# Patient Record
Sex: MALE | Race: WHITE
[De-identification: names, ages, dates, MRNs, and addresses within clinical notes are randomized per-mention and may not be internally consistent; named-entity substitution may affect disease eponyms.]

---

## 2018-11-07 ENCOUNTER — HOSPITAL ENCOUNTER (OUTPATIENT)
Age: 83
End: 2018-11-07
Payer: OTHER GOVERNMENT

## 2018-11-07 DIAGNOSIS — R07.9: ICD-10-CM

## 2018-11-07 DIAGNOSIS — I10: ICD-10-CM

## 2018-11-07 DIAGNOSIS — E78.5: Primary | ICD-10-CM

## 2018-11-07 LAB
ADD MANUAL DIFF / SLIDE REVIEW: NO
ALBUMIN SERPL-MCNC: 4.3 G/DL (ref 3.5–5)
ALBUMIN/GLOB SERPL: 1.4 {RATIO} (ref 1–2.8)
ALP SERPL-CCNC: 114 U/L (ref 38–126)
ALT SERPL-CCNC: 30 IU/L (ref 21–72)
APPEARANCE UR: CLEAR
BILIRUBIN URINE UA: NEGATIVE
BUN SERPL-MCNC: 22 MG/DL (ref 9–20)
CALCIUM SERPL-MCNC: 9.2 MG/DL (ref 8.4–10.2)
CHLORIDE SERPL-SCNC: 100 MMOL/L (ref 98–107)
CHOLEST SERPL-MCNC: 183 MG/DL (ref 140–199)
CO2 SERPL-SCNC: 28 MMOL/L (ref 22–32)
COLOR UR: YELLOW
ESTIMATED GLOMERULAR FILT RATE: 41.6 ML/MIN (ref 60–?)
GLOBULIN SER CALC-MCNC: 3.1 G/DL (ref 1.7–4.1)
GLUCOSE SERPL-MCNC: 95 MG/DL (ref 80–110)
GLUCOSE URINE UA: NEGATIVE G/DL
HDLC SERPL-MCNC: 29 MG/DL (ref 40–60)
HEMATOCRIT: 37.1 % (ref 41–53)
HEMOGLOBIN: 12.5 G/DL (ref 13.5–17.5)
HEMOLYSIS: < 15 (ref 0–50)
HGB UR QL: NEGATIVE
KETONES URINE UA: NEGATIVE
LEUKOCYTE ESTERASE URINE UA: NEGATIVE
MCV RBC: 88.2 FL (ref 80–100)
MEAN CORPUSCULAR HEMOGLOBIN: 29.9 PG (ref 26–34)
MEAN CORPUSCULAR HGB CONC: 33.8 % (ref 30–36)
NITRITE URINE UA: NEGATIVE
PH UR: 5.5 [PH] (ref 4.5–8)
PLATELET COUNT: 261 X10^3/UL (ref 150–400)
POTASSIUM SERPL-SCNC: 4 MMOL/L (ref 3.4–5.1)
PROT SERPL-MCNC: 7.4 G/DL (ref 6.3–8.2)
PROTEIN URINE UA: NEGATIVE
SODIUM SERPL-SCNC: 144 MMOL/L (ref 137–145)
SP GR UR: 1.02 (ref 1–1.03)
TRIGL SERPL-MCNC: 156 MG/DL (ref 35–150)
TSH SERPL DL<=0.05 MIU/L-ACNC: 2.73 UIU/ML (ref 0.47–4.68)
UROBILINOGEN UR QL: 0.2 E.U./DL

## 2018-11-07 PROCEDURE — 36415 COLL VENOUS BLD VENIPUNCTURE: CPT

## 2018-11-07 PROCEDURE — 84443 ASSAY THYROID STIM HORMONE: CPT

## 2018-11-07 PROCEDURE — 85025 COMPLETE CBC W/AUTO DIFF WBC: CPT

## 2018-11-07 PROCEDURE — 81003 URINALYSIS AUTO W/O SCOPE: CPT

## 2018-11-07 PROCEDURE — 80061 LIPID PANEL: CPT

## 2018-11-07 PROCEDURE — 80053 COMPREHEN METABOLIC PANEL: CPT

## 2019-07-12 ENCOUNTER — HOSPITAL ENCOUNTER (OUTPATIENT)
Age: 84
End: 2019-07-12
Payer: OTHER GOVERNMENT

## 2019-07-12 DIAGNOSIS — I10: ICD-10-CM

## 2019-07-12 DIAGNOSIS — E78.5: Primary | ICD-10-CM

## 2019-07-12 LAB
ADD MANUAL DIFF / SLIDE REVIEW: NO
ALBUMIN SERPL-MCNC: 4.2 G/DL (ref 3.5–5)
ALBUMIN/GLOB SERPL: 1.3 {RATIO} (ref 1–2.8)
ALP SERPL-CCNC: 133 U/L (ref 38–126)
ALT SERPL-CCNC: 23 IU/L (ref 21–72)
BUN SERPL-MCNC: 21 MG/DL (ref 9–20)
CALCIUM SERPL-MCNC: 9 MG/DL (ref 8.4–10.2)
CHLORIDE SERPL-SCNC: 100 MMOL/L (ref 98–107)
CHOLEST SERPL-MCNC: 110 MG/DL (ref 140–199)
CO2 SERPL-SCNC: 29 MMOL/L (ref 22–32)
ESTIMATED GLOMERULAR FILT RATE: 41.6 ML/MIN (ref 60–?)
GLOBULIN SER CALC-MCNC: 3.2 G/DL (ref 1.7–4.1)
GLUCOSE SERPL-MCNC: 97 MG/DL (ref 80–110)
HDLC SERPL-MCNC: 23 MG/DL (ref 40–60)
HEMATOCRIT: 37.6 % (ref 41–53)
HEMOGLOBIN: 12.7 G/DL (ref 13.5–17.5)
HEMOLYSIS: < 15 (ref 0–50)
LYMPHOCYTES # SPEC AUTO: 1500 /UL (ref 1100–4500)
MCV RBC: 87.9 FL (ref 80–100)
MEAN CORPUSCULAR HEMOGLOBIN: 29.6 PG (ref 26–34)
MEAN CORPUSCULAR HGB CONC: 33.7 % (ref 30–36)
PLATELET COUNT: 239 X10^3/UL (ref 150–400)
POTASSIUM SERPL-SCNC: 3.3 MMOL/L (ref 3.4–5.1)
PROT SERPL-MCNC: 7.4 G/DL (ref 6.3–8.2)
SODIUM SERPL-SCNC: 142 MMOL/L (ref 137–145)
TRIGL SERPL-MCNC: 175 MG/DL (ref 35–150)

## 2019-07-12 PROCEDURE — 80061 LIPID PANEL: CPT

## 2019-07-12 PROCEDURE — 85025 COMPLETE CBC W/AUTO DIFF WBC: CPT

## 2019-07-12 PROCEDURE — 80053 COMPREHEN METABOLIC PANEL: CPT

## 2019-07-12 PROCEDURE — 36415 COLL VENOUS BLD VENIPUNCTURE: CPT

## 2019-07-29 ENCOUNTER — HOSPITAL ENCOUNTER (OUTPATIENT)
Age: 84
End: 2019-07-29
Payer: OTHER GOVERNMENT

## 2019-07-29 DIAGNOSIS — Z90.49: ICD-10-CM

## 2019-07-29 DIAGNOSIS — R74.0: ICD-10-CM

## 2019-07-29 DIAGNOSIS — N28.1: Primary | ICD-10-CM

## 2019-07-29 PROCEDURE — 76700 US EXAM ABDOM COMPLETE: CPT

## 2019-07-29 NOTE — DI.US.S_ITS
PROCEDURE:  US ABDOMEN COMPLETE  
   
INDICATIONS:  ELEVATED LDH  
   
TECHNIQUE:    
Real-time scanning was performed of the abdominal and retroperitoneal organs, with image   
documentation.    
   
COMPARISON:  None.  
   
FINDINGS:    
   
Liver:  Liver is normal in size and demonstrates diffusely increased echotexture.    
   
Gallbladder: The gallbladder is surgically removed  
   
Biliary ducts:  Intrahepatic bile ducts are non-dilated.  Extrahepatic bile duct caliber   
measures 9.4 mm.  Normal is 6-7 mm or less in diameter, or 10 mm or less   
post-cholecystectomy.    
   
Pancreas:  Visualized portions of the pancreas are sonographically normal.    
   
Spleen:  Spleen is normal in size and homogeneous in echotexture.    
   
Kidneys:  Kidneys are normal in size and echotexture.  Right kidney measures 10.5 cm   
long; left kidney measures 11.0 cm long.  No hydronephrosis or nephrolithiasis.  No solid   
masses.  There is a 3.0 x 1.9 x 2.6 cm simple cyst in the mid left kidney.  
   
Aorta:  Visualized aorta is normal in caliber at less than 3 cm.    
   
Iliacs:  Proximal common iliac arteries are normal in caliber at less than 2.5 cm.    
   
IVC:  Intrahepatic inferior vena cava is patent.    
   
Miscellaneous:  No free abdominal fluid.    
   
   
IMPRESSION:  
   
1.  Diffusely increased hepatic echotexture. This finding is most likely secondary to   
hepatic fatty infiltration although other hepatocellular disease may have a similar   
appearance. Recommend clinical correlation.  
   
2. Cholecystectomy.  
   
3. A 3.0 x 1.9 x 2.6 cm simple cyst in the left kidney.  
   
   
   
Dictated by: Pascual Meier M.D. on 7/29/2019 at 10:50       
Approved by: Pascual Meier M.D. on 7/29/2019 at 10:52

## 2019-10-21 ENCOUNTER — HOSPITAL ENCOUNTER (OUTPATIENT)
Age: 84
End: 2019-10-21
Payer: OTHER GOVERNMENT

## 2019-10-21 DIAGNOSIS — N18.9: ICD-10-CM

## 2019-10-21 DIAGNOSIS — I10: Primary | ICD-10-CM

## 2019-10-21 DIAGNOSIS — R74.0: ICD-10-CM

## 2019-10-21 DIAGNOSIS — K76.0: ICD-10-CM

## 2019-10-21 DIAGNOSIS — Z95.1: ICD-10-CM

## 2019-10-21 LAB
ADD MANUAL DIFF / SLIDE REVIEW: NO
ALBUMIN SERPL-MCNC: 4 G/DL (ref 3.5–5)
ALBUMIN/GLOB SERPL: 1.3 {RATIO} (ref 1–2.8)
ALP SERPL-CCNC: 134 U/L (ref 38–126)
ALT SERPL-CCNC: 19 IU/L (ref 21–72)
BUN SERPL-MCNC: 37 MG/DL (ref 9–20)
CALCIUM SERPL-MCNC: 9.1 MG/DL (ref 8.4–10.2)
CHLORIDE SERPL-SCNC: 103 MMOL/L (ref 98–107)
CO2 SERPL-SCNC: 27 MMOL/L (ref 22–32)
ESTIMATED GLOMERULAR FILT RATE: 36.2 ML/MIN (ref 60–?)
GLOBULIN SER CALC-MCNC: 3 G/DL (ref 1.7–4.1)
GLUCOSE SERPL-MCNC: 100 MG/DL (ref 80–110)
HEMATOCRIT: 37.1 % (ref 41–53)
HEMOGLOBIN: 12.3 G/DL (ref 13.5–17.5)
HEMOLYSIS: < 15 (ref 0–50)
LYMPHOCYTES # SPEC AUTO: 2200 /UL (ref 1100–4500)
MCV RBC: 91 FL (ref 80–100)
MEAN CORPUSCULAR HEMOGLOBIN: 30.1 PG (ref 26–34)
MEAN CORPUSCULAR HGB CONC: 33.1 % (ref 30–36)
PLATELET COUNT: 195 X10^3/UL (ref 150–400)
POTASSIUM SERPL-SCNC: 4.5 MMOL/L (ref 3.4–5.1)
PROT SERPL-MCNC: 7 G/DL (ref 6.3–8.2)
SODIUM SERPL-SCNC: 140 MMOL/L (ref 137–145)

## 2019-10-21 PROCEDURE — 80053 COMPREHEN METABOLIC PANEL: CPT

## 2019-10-21 PROCEDURE — 36415 COLL VENOUS BLD VENIPUNCTURE: CPT

## 2019-10-21 PROCEDURE — 85025 COMPLETE CBC W/AUTO DIFF WBC: CPT

## 2019-12-27 ENCOUNTER — HOSPITAL ENCOUNTER (OUTPATIENT)
Age: 84
End: 2019-12-27
Payer: OTHER GOVERNMENT

## 2019-12-27 DIAGNOSIS — I10: Primary | ICD-10-CM

## 2019-12-27 LAB — MAGNESIUM SERPL-MCNC: 1.8 MG/DL (ref 1.6–2.3)

## 2019-12-27 PROCEDURE — 83735 ASSAY OF MAGNESIUM: CPT

## 2019-12-27 PROCEDURE — 36415 COLL VENOUS BLD VENIPUNCTURE: CPT

## 2020-05-22 ENCOUNTER — HOSPITAL ENCOUNTER (OUTPATIENT)
Age: 85
End: 2020-05-22
Payer: OTHER GOVERNMENT

## 2020-05-22 DIAGNOSIS — I10: ICD-10-CM

## 2020-05-22 DIAGNOSIS — E78.5: Primary | ICD-10-CM

## 2020-05-22 DIAGNOSIS — F32.9: ICD-10-CM

## 2020-05-22 DIAGNOSIS — N18.3: ICD-10-CM

## 2020-05-22 DIAGNOSIS — Z79.899: ICD-10-CM

## 2020-05-22 LAB
ALBUMIN SERPL-MCNC: 4 G/DL (ref 3.5–5)
ALBUMIN/GLOB SERPL: 1.2 {RATIO} (ref 1–2.8)
ALP SERPL-CCNC: 124 U/L (ref 38–126)
ALT SERPL-CCNC: 17 IU/L (ref ?–50)
BUN SERPL-MCNC: 26 MG/DL (ref 9–20)
CALCIUM SERPL-MCNC: 8.9 MG/DL (ref 8.4–10.2)
CHLORIDE SERPL-SCNC: 108 MMOL/L (ref 98–107)
CHOLEST SERPL-MCNC: 99 MG/DL (ref 140–199)
CO2 SERPL-SCNC: 24 MMOL/L (ref 22–32)
ESTIMATED GLOMERULAR FILT RATE: 40.6 ML/MIN (ref 60–?)
GLOBULIN SER CALC-MCNC: 3.3 G/DL (ref 1.7–4.1)
GLUCOSE SERPL-MCNC: 94 MG/DL (ref 80–110)
HDLC SERPL-MCNC: 23 MG/DL (ref 40–60)
HEMOLYSIS: < 15 (ref 0–50)
MICROALBUMI CREATININ RATIO UR: 8 UG/MG CR (ref ?–30)
POTASSIUM SERPL-SCNC: 4.7 MMOL/L (ref 3.4–5.1)
PROT SERPL-MCNC: 7.3 G/DL (ref 6.3–8.2)
SODIUM SERPL-SCNC: 141 MMOL/L (ref 137–145)
T3FREE SERPL-MCNC: 3.63 PG/ML (ref 2.77–5.27)
T4 FREE SERPL-MCNC: 1.1 NG/DL (ref 0.78–2.19)
TRIGL SERPL-MCNC: 66 MG/DL (ref 35–150)
TSH SERPL DL<=0.05 MIU/L-ACNC: 1.62 UIU/ML (ref 0.47–4.68)

## 2020-05-22 PROCEDURE — 82570 ASSAY OF URINE CREATININE: CPT

## 2020-05-22 PROCEDURE — 36415 COLL VENOUS BLD VENIPUNCTURE: CPT

## 2020-05-22 PROCEDURE — 84443 ASSAY THYROID STIM HORMONE: CPT

## 2020-05-22 PROCEDURE — 82043 UR ALBUMIN QUANTITATIVE: CPT

## 2020-05-22 PROCEDURE — 84481 FREE ASSAY (FT-3): CPT

## 2020-05-22 PROCEDURE — 80061 LIPID PANEL: CPT

## 2020-05-22 PROCEDURE — 80053 COMPREHEN METABOLIC PANEL: CPT

## 2020-05-22 PROCEDURE — 84439 ASSAY OF FREE THYROXINE: CPT

## 2020-07-03 ENCOUNTER — HOSPITAL ENCOUNTER (OUTPATIENT)
Age: 85
End: 2020-07-03
Payer: OTHER GOVERNMENT

## 2020-07-03 DIAGNOSIS — M47.816: ICD-10-CM

## 2020-07-03 DIAGNOSIS — M54.9: Primary | ICD-10-CM

## 2020-07-03 PROCEDURE — 72100 X-RAY EXAM L-S SPINE 2/3 VWS: CPT

## 2020-08-31 ENCOUNTER — HOSPITAL ENCOUNTER (OUTPATIENT)
Age: 85
End: 2020-08-31
Payer: OTHER GOVERNMENT

## 2020-08-31 DIAGNOSIS — M47.817: ICD-10-CM

## 2020-08-31 DIAGNOSIS — M47.816: ICD-10-CM

## 2020-08-31 DIAGNOSIS — M54.9: Primary | ICD-10-CM

## 2020-08-31 PROCEDURE — 72148 MRI LUMBAR SPINE W/O DYE: CPT

## 2020-08-31 NOTE — DI.MRI.S_ITS
PROCEDURE:  MR LUMBAR SPINE WO CON  
   
INDICATIONS:  Dorsalgia, unspecified  
   
TECHNIQUE:    
Noncontrast sagittal T1 spin echo and T2 fast echo, sagittal STIR, axial T1 and T2 fast   
spin echo through the lumbar spine.  In cases with scoliosis, additional coronal T2 fast   
spin echo may be performed.    
   
COMPARISON:  [facility], CR, XR LUMBAR SPINE 2-3V, 7/03/2020, 10:09.  
   
FINDINGS:    
Image quality:  Excellent.    
   
Alignment and Curvature:  There is normal bony alignment.    
   
Bone Marrow:  Marrow is of normal overall signal.  No acute vertebral body compression   
fractures.    
   
Spinal Cord:  Conus medullaris terminates at the L1 level.  Visualized cord demonstrates   
normal signal and size.    
   
Paraspinous Soft Tissues:  No paravertebral masses.    
   
T12-L1: Normal appearance.    
   
L1-L2:  The disc height is well-preserved.  Loss of disc signal is seen at this level.    
Mild to moderate disc bulge is seen. Mild facet joint hypertrophy is seen.  Moderate   
bilateral neural foraminal narrowing is seen, left worse than right. Mild central canal   
narrowing is seen.    
   
L2-L3:  The disc height is well-preserved.  Loss of disc signal is seen at this level.    
Moderate disc bulge is seen, which is eccentric to the left.  There is a left lateral   
recess/foraminal disc protrusion.  A mild central disc protrusion is also seen.  There is   
moderate bilateral neural foraminal narrowing seen, left worse than right.  Mild to   
moderate central canal narrowing is seen.   
   
L3-L4:  The disc height is well-preserved.  Loss of disc signal is seen at this level.    
Moderate disc bulge is seen, which is eccentric to the right.  There is at least moderate   
facet hypertrophy seen.  There is at least moderate bilateral neural foraminal narrowing   
seen, with associated compression upon the exiting nerve roots. Moderate central canal   
narrowing is seen.    
   
L4-L5:  There is at least moderate loss of disc height and disc signal seen.  Moderate   
disc bulge is seen, which is eccentric to the left.  Mild to moderate facet hypertrophy   
is seen.  There is moderate right-sided and moderate to severe left-sided neural   
foraminal narrowing seen.  There is a degree of compression seen upon the exiting left L4   
nerve root.  Mild to moderate central canal narrowing is seen.  
   
L5-S1:  At least moderate loss of disc height and disc signal can be seen.  Moderate   
prominent disc bulge is seen.  Mild to moderate facet hypertrophy is seen.  Mild to   
moderate bilateral neural foraminal narrowing is seen, right worse than left.  No   
significant seen central canal narrowing is seen.  
   
   
   
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   
prominent at the L4-L5 level.  
   
   
   
Dictated by: Gianfranco Jimenes M.D. on 8/31/2020 at 15:56       
Approved by: Gianfranco Jimenes M.D. on 8/31/2020 at 16:00

## 2021-04-14 ENCOUNTER — HOSPITAL ENCOUNTER (OUTPATIENT)
Age: 86
End: 2021-04-14
Payer: MEDICARE

## 2021-04-14 DIAGNOSIS — E78.5: Primary | ICD-10-CM

## 2021-04-14 LAB
ALBUMIN SERPL-MCNC: 3.8 G/DL (ref 3.5–5)
ALBUMIN/GLOB SERPL: 1.4 {RATIO} (ref 1–2.8)
ALP SERPL-CCNC: 115 U/L (ref 38–126)
ALT SERPL-CCNC: 24 IU/L (ref ?–50)
BUN SERPL-MCNC: 27 MG/DL (ref 9–20)
CALCIUM SERPL-MCNC: 9 MG/DL (ref 8.4–10.2)
CHLORIDE SERPL-SCNC: 106 MMOL/L (ref 98–107)
CHOLEST SERPL-MCNC: 112 MG/DL (ref 140–199)
CO2 SERPL-SCNC: 25 MMOL/L (ref 22–32)
ESTIMATED GLOMERULAR FILT RATE: 38.9 ML/MIN (ref 60–?)
GLOBULIN SER CALC-MCNC: 2.8 G/DL (ref 1.7–4.1)
GLUCOSE SERPL-MCNC: 98 MG/DL (ref 80–110)
HDLC SERPL-MCNC: 28 MG/DL (ref 40–60)
HEMOLYSIS: < 15 (ref 0–50)
POTASSIUM SERPL-SCNC: 4.6 MMOL/L (ref 3.4–5.1)
PROT SERPL-MCNC: 6.6 G/DL (ref 6.3–8.2)
SODIUM SERPL-SCNC: 139 MMOL/L (ref 137–145)
TRIGL SERPL-MCNC: 154 MG/DL (ref 35–150)

## 2021-04-14 PROCEDURE — 36415 COLL VENOUS BLD VENIPUNCTURE: CPT

## 2021-04-14 PROCEDURE — 80061 LIPID PANEL: CPT

## 2021-04-14 PROCEDURE — 80053 COMPREHEN METABOLIC PANEL: CPT

## 2021-05-03 ENCOUNTER — HOSPITAL ENCOUNTER (OUTPATIENT)
Dept: HOSPITAL 73 - ECHO | Age: 86
End: 2021-05-03
Payer: MEDICARE

## 2021-05-03 DIAGNOSIS — I08.2: Primary | ICD-10-CM

## 2021-05-03 DIAGNOSIS — I25.5: ICD-10-CM

## 2021-05-03 PROCEDURE — 93306 TTE W/DOPPLER COMPLETE: CPT

## 2021-06-08 ENCOUNTER — HOSPITAL ENCOUNTER (OUTPATIENT)
Age: 86
End: 2021-06-08
Payer: MEDICARE

## 2021-06-08 DIAGNOSIS — K52.9: Primary | ICD-10-CM

## 2021-06-08 DIAGNOSIS — R53.83: ICD-10-CM

## 2021-06-08 LAB
ALBUMIN SERPL-MCNC: 3.8 G/DL (ref 3.5–5)
ALBUMIN/GLOB SERPL: 1.4 {RATIO} (ref 1–2.8)
ALP SERPL-CCNC: 116 U/L (ref 38–126)
ALT SERPL-CCNC: 24 IU/L (ref ?–50)
BUN SERPL-MCNC: 21 MG/DL (ref 9–20)
CALCIUM SERPL-MCNC: 8.7 MG/DL (ref 8.4–10.2)
CHLORIDE SERPL-SCNC: 106 MMOL/L (ref 98–107)
CO2 SERPL-SCNC: 26 MMOL/L (ref 22–32)
ESTIMATED GLOMERULAR FILT RATE: 39.7 ML/MIN (ref 60–?)
GLOBULIN SER CALC-MCNC: 2.7 G/DL (ref 1.7–4.1)
GLUCOSE SERPL-MCNC: 90 MG/DL (ref 80–110)
HEMATOCRIT: 36.8 % (ref 41–53)
HEMOGLOBIN: 12.1 G/DL (ref 13.5–17.5)
HEMOLYSIS: < 15 (ref 0–50)
MCV RBC: 92.2 FL (ref 80–100)
MEAN CORPUSCULAR HEMOGLOBIN: 30.4 PG (ref 26–34)
MEAN CORPUSCULAR HGB CONC: 33 % (ref 30–36)
PLATELET COUNT: 187 X10^3/UL (ref 150–400)
POTASSIUM SERPL-SCNC: 4.4 MMOL/L (ref 3.4–5.1)
PROT SERPL-MCNC: 6.5 G/DL (ref 6.3–8.2)
SODIUM SERPL-SCNC: 140 MMOL/L (ref 137–145)
T3FREE SERPL-MCNC: 3.5 PG/ML (ref 2.77–5.27)
T4 FREE SERPL-MCNC: 0.93 NG/DL (ref 0.78–2.19)
TSH SERPL DL<=0.05 MIU/L-ACNC: 2.33 UIU/ML (ref 0.47–4.68)

## 2021-06-08 PROCEDURE — 80053 COMPREHEN METABOLIC PANEL: CPT

## 2021-06-08 PROCEDURE — 85027 COMPLETE CBC AUTOMATED: CPT

## 2021-06-08 PROCEDURE — 84439 ASSAY OF FREE THYROXINE: CPT

## 2021-06-08 PROCEDURE — 84403 ASSAY OF TOTAL TESTOSTERONE: CPT

## 2021-06-08 PROCEDURE — 84402 ASSAY OF FREE TESTOSTERONE: CPT

## 2021-06-08 PROCEDURE — 84481 FREE ASSAY (FT-3): CPT

## 2021-06-08 PROCEDURE — 36415 COLL VENOUS BLD VENIPUNCTURE: CPT

## 2021-06-08 PROCEDURE — 84443 ASSAY THYROID STIM HORMONE: CPT

## 2021-06-13 LAB — TESTOST FREE MFR SERPL: 1.93 % (ref 1.5–4.2)

## 2021-07-16 ENCOUNTER — HOSPITAL ENCOUNTER (OUTPATIENT)
Age: 86
End: 2021-07-16
Payer: MEDICARE

## 2021-07-16 DIAGNOSIS — D64.9: Primary | ICD-10-CM

## 2021-07-16 LAB
HEMATOCRIT: 35.9 % (ref 41–53)
HEMOGLOBIN: 12.2 G/DL (ref 13.5–17.5)
HEMOLYSIS: < 15 (ref 0–50)
IRON SERPL-MCNC: 67 UG/DL (ref 49–181)
MCV RBC: 92.1 FL (ref 80–100)
MEAN CORPUSCULAR HEMOGLOBIN: 31.2 PG (ref 26–34)
MEAN CORPUSCULAR HGB CONC: 33.9 % (ref 30–36)
PERCENT IRON SATURATION: 23 % (ref 20–50)
PLATELET COUNT: 186 X10^3/UL (ref 150–400)
TIBC SERPL-MCNC: 290 UG/DL (ref 261–462)
TRANSFERRIN SERPL-MCNC: 205 MG/DL (ref 206–381)

## 2021-07-16 PROCEDURE — 85027 COMPLETE CBC AUTOMATED: CPT

## 2021-07-16 PROCEDURE — 83550 IRON BINDING TEST: CPT

## 2021-07-16 PROCEDURE — 36415 COLL VENOUS BLD VENIPUNCTURE: CPT

## 2021-07-16 PROCEDURE — 83540 ASSAY OF IRON: CPT

## 2021-12-08 ENCOUNTER — HOSPITAL ENCOUNTER (OUTPATIENT)
Age: 86
End: 2021-12-08
Payer: OTHER GOVERNMENT

## 2021-12-08 DIAGNOSIS — I10: ICD-10-CM

## 2021-12-08 DIAGNOSIS — E78.2: Primary | ICD-10-CM

## 2021-12-08 DIAGNOSIS — R53.83: ICD-10-CM

## 2021-12-08 DIAGNOSIS — F32.9: ICD-10-CM

## 2021-12-08 DIAGNOSIS — N18.32: ICD-10-CM

## 2021-12-08 LAB
ADD MANUAL DIFF / SLIDE REVIEW: NO
ALBUMIN SERPL-MCNC: 4.3 G/DL (ref 3.5–5)
ALBUMIN/GLOB SERPL: 1.5 {RATIO} (ref 1–2.8)
ALP SERPL-CCNC: 107 U/L (ref 38–126)
ALT SERPL-CCNC: 23 IU/L (ref ?–50)
BUN SERPL-MCNC: 27 MG/DL (ref 9–20)
CALCIUM SERPL-MCNC: 9 MG/DL (ref 8.4–10.2)
CHLORIDE SERPL-SCNC: 106 MMOL/L (ref 98–107)
CHOLEST SERPL-MCNC: 120 MG/DL (ref 140–199)
CO2 SERPL-SCNC: 26 MMOL/L (ref 22–32)
ESTIMATED GLOMERULAR FILT RATE: 38.5 ML/MIN (ref 60–?)
GLOBULIN SER CALC-MCNC: 2.9 G/DL (ref 1.7–4.1)
GLUCOSE SERPL-MCNC: 93 MG/DL (ref 80–110)
HDLC SERPL-MCNC: 25 MG/DL (ref 40–60)
HEMATOCRIT: 37.1 % (ref 41–53)
HEMOGLOBIN: 12.4 G/DL (ref 13.5–17.5)
HEMOLYSIS: < 15 (ref 0–50)
LYMPHOCYTES # SPEC AUTO: 1800 /UL (ref 1100–4500)
MCV RBC: 91.1 FL (ref 80–100)
MEAN CORPUSCULAR HEMOGLOBIN: 30.6 PG (ref 26–34)
MEAN CORPUSCULAR HGB CONC: 33.6 % (ref 30–36)
MICROALBUMI CREATININ RATIO UR: 15.9 UG/MG CR (ref ?–30)
PLATELET COUNT: 198 X10^3/UL (ref 150–400)
POTASSIUM SERPL-SCNC: 4.2 MMOL/L (ref 3.4–5.1)
PROT SERPL-MCNC: 7.2 G/DL (ref 6.3–8.2)
SODIUM SERPL-SCNC: 139 MMOL/L (ref 137–145)
T3FREE SERPL-MCNC: 3.43 PG/ML (ref 2.77–5.27)
T4 FREE SERPL-MCNC: 0.91 NG/DL (ref 0.78–2.19)
TRIGL SERPL-MCNC: 160 MG/DL (ref 35–150)
TSH SERPL DL<=0.05 MIU/L-ACNC: 2.12 UIU/ML (ref 0.47–4.68)

## 2021-12-08 PROCEDURE — 36415 COLL VENOUS BLD VENIPUNCTURE: CPT

## 2021-12-08 PROCEDURE — 80053 COMPREHEN METABOLIC PANEL: CPT

## 2021-12-08 PROCEDURE — 82043 UR ALBUMIN QUANTITATIVE: CPT

## 2021-12-08 PROCEDURE — 82570 ASSAY OF URINE CREATININE: CPT

## 2021-12-08 PROCEDURE — 85025 COMPLETE CBC W/AUTO DIFF WBC: CPT

## 2021-12-08 PROCEDURE — 80061 LIPID PANEL: CPT

## 2021-12-08 PROCEDURE — 84439 ASSAY OF FREE THYROXINE: CPT

## 2021-12-08 PROCEDURE — 84443 ASSAY THYROID STIM HORMONE: CPT

## 2021-12-08 PROCEDURE — 84481 FREE ASSAY (FT-3): CPT

## 2022-01-06 NOTE — DI.RAD.S_ITS
PROCEDURE:  XR LUMBAR SPINE 2-3V  
   
INDICATIONS:  back pain  
   
TECHNIQUE: 3 views of the lumbar spine were acquired.    
   
COMPARISON:  None.  
   
FINDINGS:    
   
Bones: 5 non-rib-bearing vertebrae are present.  There is normal bony alignment.  No   
vertebral body compression fractures.  No suspicious bony lesions. Moderate degenerative   
change most pronounced in the lower lumbar spine where there is loss of intervertebral   
disc space height, endplate sclerosis, and osteophytosis.  
   
Soft tissues:  Overlying bowel gas pattern is normal.  No suspicious soft tissue   
calcifications. Vascular calcification.  Cholecystectomy clips.  
   
IMPRESSION:  
No compression fracture.   
Moderate degenerative change in the lower lumbar spine.  
   
   
   
Dictated by: Herbert Parnell M.D. on 7/03/2020 at 10:43       
Approved by: Herbert Parnell M.D. on 7/03/2020 at 10:44 no

## 2022-11-08 ENCOUNTER — HOSPITAL ENCOUNTER (OUTPATIENT)
Age: 87
End: 2022-11-08
Payer: OTHER GOVERNMENT

## 2022-11-08 DIAGNOSIS — R42: ICD-10-CM

## 2022-11-08 DIAGNOSIS — F32.9: ICD-10-CM
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
## 2022-11-08 DIAGNOSIS — R53.83: ICD-10-CM

## 2022-11-08 DIAGNOSIS — I10: ICD-10-CM

## 2022-11-08 DIAGNOSIS — N18.32: ICD-10-CM

## 2022-11-08 DIAGNOSIS — E78.2: Primary | ICD-10-CM

## 2022-11-08 LAB
ADD MANUAL DIFF / SLIDE REVIEW: NO
ALBUMIN SERPL-MCNC: 4 G/DL (ref 3.5–5)
ALBUMIN/GLOB SERPL: 1.3 {RATIO} (ref 1–2.8)
ALP SERPL-CCNC: 114 U/L (ref 38–126)
ALT SERPL-CCNC: 25 IU/L (ref ?–50)
BUN SERPL-MCNC: 29 MG/DL (ref 9–20)
CALCIUM SERPL-MCNC: 8.8 MG/DL (ref 8.4–10.2)
CHLORIDE SERPL-SCNC: 104 MMOL/L (ref 98–107)
CHOLEST SERPL-MCNC: 106 MG/DL (ref 140–199)
CO2 SERPL-SCNC: 23 MMOL/L (ref 22–32)
ESTIMATED GLOMERULAR FILT RATE: 40 ML/MIN (ref 60–?)
GLOBULIN SER CALC-MCNC: 3 G/DL (ref 1.7–4.1)
GLUCOSE SERPL-MCNC: 94 MG/DL (ref 80–110)
HDLC SERPL-MCNC: 25 MG/DL (ref 40–60)
HEMATOCRIT: 35.4 % (ref 41–53)
HEMOGLOBIN: 12 G/DL (ref 13.5–17.5)
HEMOLYSIS: < 15 (ref 0–50)
LYMPHOCYTES # SPEC AUTO: 1700 /UL (ref 1100–4500)
MCV RBC: 91.5 FL (ref 80–100)
MEAN CORPUSCULAR HEMOGLOBIN: 31 PG (ref 26–34)
MEAN CORPUSCULAR HGB CONC: 33.9 % (ref 30–36)
MICROALBUMI CREATININ RATIO UR: 18.7 UG/MG CR (ref ?–30)
PLATELET COUNT: 209 X10^3/UL (ref 150–400)
POTASSIUM SERPL-SCNC: 4.6 MMOL/L (ref 3.4–5.1)
PROT SERPL-MCNC: 7 G/DL (ref 6.3–8.2)
SODIUM SERPL-SCNC: 139 MMOL/L (ref 137–145)
T3FREE SERPL-MCNC: 3.88 PG/ML (ref 2.77–5.27)
T4 FREE SERPL-MCNC: 1.13 NG/DL (ref 0.78–2.19)
TRIGL SERPL-MCNC: 173 MG/DL (ref 35–150)
TSH SERPL DL<=0.05 MIU/L-ACNC: 0.74 UIU/ML (ref 0.47–4.68)

## 2022-11-08 PROCEDURE — 82043 UR ALBUMIN QUANTITATIVE: CPT

## 2022-11-08 PROCEDURE — 36415 COLL VENOUS BLD VENIPUNCTURE: CPT

## 2022-11-08 PROCEDURE — 84481 FREE ASSAY (FT-3): CPT

## 2022-11-08 PROCEDURE — 85025 COMPLETE CBC W/AUTO DIFF WBC: CPT

## 2022-11-08 PROCEDURE — 84439 ASSAY OF FREE THYROXINE: CPT

## 2022-11-08 PROCEDURE — 84443 ASSAY THYROID STIM HORMONE: CPT

## 2022-11-08 PROCEDURE — 82570 ASSAY OF URINE CREATININE: CPT

## 2022-11-08 PROCEDURE — 80053 COMPREHEN METABOLIC PANEL: CPT

## 2022-11-08 PROCEDURE — 80061 LIPID PANEL: CPT

## 2023-02-08 ENCOUNTER — HOSPITAL ENCOUNTER (OUTPATIENT)
Age: 88
End: 2023-02-08
Payer: OTHER GOVERNMENT

## 2023-02-08 DIAGNOSIS — N18.32: ICD-10-CM

## 2023-02-08 DIAGNOSIS — D64.9: Primary | ICD-10-CM

## 2023-02-08 LAB
ADD MANUAL DIFF / SLIDE REVIEW: NO
ALBUMIN SERPL-MCNC: 4.1 G/DL (ref 3.5–5)
ALBUMIN/GLOB SERPL: 1.3 {RATIO} (ref 1–2.8)
ALP SERPL-CCNC: 134 U/L (ref 38–126)
ALT SERPL-CCNC: 28 IU/L (ref ?–50)
BUN SERPL-MCNC: 27 MG/DL (ref 9–20)
CALCIUM SERPL-MCNC: 8.6 MG/DL (ref 8.4–10.2)
CHLORIDE SERPL-SCNC: 101 MMOL/L (ref 98–107)
CO2 SERPL-SCNC: 25 MMOL/L (ref 22–32)
ESTIMATED GLOMERULAR FILT RATE: 39 ML/MIN (ref 60–?)
GLOBULIN SER CALC-MCNC: 3.1 G/DL (ref 1.7–4.1)
GLUCOSE SERPL-MCNC: 93 MG/DL (ref 80–110)
HEMATOCRIT: 37.4 % (ref 41–53)
HEMOGLOBIN: 12.4 G/DL (ref 13.5–17.5)
HEMOLYSIS: < 15 (ref 0–50)
LYMPHOCYTES # SPEC AUTO: 2100 /UL (ref 1100–4500)
MCV RBC: 91.8 FL (ref 80–100)
MEAN CORPUSCULAR HEMOGLOBIN: 30.5 PG (ref 26–34)
MEAN CORPUSCULAR HGB CONC: 33.3 % (ref 30–36)
PLATELET COUNT: 213 X10^3/UL (ref 150–400)
POTASSIUM SERPL-SCNC: 4.6 MMOL/L (ref 3.4–5.1)
PROT SERPL-MCNC: 7.2 G/DL (ref 6.3–8.2)
SODIUM SERPL-SCNC: 139 MMOL/L (ref 137–145)

## 2023-02-08 PROCEDURE — 80053 COMPREHEN METABOLIC PANEL: CPT

## 2023-02-08 PROCEDURE — 85025 COMPLETE CBC W/AUTO DIFF WBC: CPT

## 2023-02-08 PROCEDURE — 36415 COLL VENOUS BLD VENIPUNCTURE: CPT

## 2023-07-12 ENCOUNTER — HOSPITAL ENCOUNTER (OUTPATIENT)
Age: 88
End: 2023-07-12
Payer: OTHER GOVERNMENT

## 2023-07-12 DIAGNOSIS — R79.89: ICD-10-CM

## 2023-07-12 DIAGNOSIS — D51.8: Primary | ICD-10-CM

## 2023-07-12 LAB
ALBUMIN SERPL-MCNC: 4 G/DL (ref 3.5–5)
ALBUMIN/GLOB SERPL: 1.4 {RATIO} (ref 1–2.8)
ALP SERPL-CCNC: 151 U/L (ref 38–126)
ALT SERPL-CCNC: 26 IU/L (ref ?–50)
BUN SERPL-MCNC: 27 MG/DL (ref 9–20)
CALCIUM SERPL-MCNC: 8.6 MG/DL (ref 8.4–10.2)
CHLORIDE SERPL-SCNC: 101 MMOL/L (ref 98–107)
CO2 SERPL-SCNC: 27 MMOL/L (ref 22–32)
ESTIMATED GLOMERULAR FILT RATE: 36 ML/MIN (ref 60–?)
GLOBULIN SER CALC-MCNC: 2.9 G/DL (ref 1.7–4.1)
GLUCOSE SERPL-MCNC: 90 MG/DL (ref 80–110)
HEMATOCRIT: 36.6 % (ref 41–53)
HEMOGLOBIN: 12.4 G/DL (ref 13.5–17.5)
HEMOLYSIS: < 15 (ref 0–50)
MCV RBC: 90.3 FL (ref 80–100)
MEAN CORPUSCULAR HEMOGLOBIN: 30.7 PG (ref 26–34)
MEAN CORPUSCULAR HGB CONC: 34 % (ref 30–36)
PLATELET COUNT: 213 X10^3/UL (ref 150–400)
POTASSIUM SERPL-SCNC: 4.4 MMOL/L (ref 3.4–5.1)
PROT SERPL-MCNC: 6.9 G/DL (ref 6.3–8.2)
SODIUM SERPL-SCNC: 137 MMOL/L (ref 137–145)
VIT B12 SERPL-MCNC: 906 PG/ML (ref 239–931)

## 2023-07-12 PROCEDURE — 82607 VITAMIN B-12: CPT

## 2023-07-12 PROCEDURE — 85027 COMPLETE CBC AUTOMATED: CPT

## 2023-07-12 PROCEDURE — 80053 COMPREHEN METABOLIC PANEL: CPT

## 2023-07-12 PROCEDURE — 36415 COLL VENOUS BLD VENIPUNCTURE: CPT

## 2023-09-06 ENCOUNTER — HOSPITAL ENCOUNTER (OUTPATIENT)
Age: 88
End: 2023-09-06
Payer: MEDICARE

## 2023-09-06 DIAGNOSIS — E78.5: Primary | ICD-10-CM

## 2023-09-06 DIAGNOSIS — I10: ICD-10-CM

## 2023-09-06 LAB
ALBUMIN SERPL-MCNC: 3.9 G/DL (ref 3.5–5)
ALBUMIN/GLOB SERPL: 1.4 {RATIO} (ref 1–2.8)
ALP SERPL-CCNC: 115 U/L (ref 38–126)
ALT SERPL-CCNC: 25 IU/L (ref ?–50)
BUN SERPL-MCNC: 25 MG/DL (ref 9–20)
CALCIUM SERPL-MCNC: 8.6 MG/DL (ref 8.4–10.2)
CHLORIDE SERPL-SCNC: 101 MMOL/L (ref 98–107)
CHOLEST SERPL-MCNC: 117 MG/DL (ref 140–199)
CO2 SERPL-SCNC: 28 MMOL/L (ref 22–32)
ESTIMATED GLOMERULAR FILT RATE: 38 ML/MIN (ref 60–?)
GLOBULIN SER CALC-MCNC: 2.8 G/DL (ref 1.7–4.1)
GLUCOSE SERPL-MCNC: 86 MG/DL (ref 80–110)
HDLC SERPL-MCNC: 25 MG/DL (ref 40–60)
HEMOLYSIS: < 15 (ref 0–50)
POTASSIUM SERPL-SCNC: 4.5 MMOL/L (ref 3.4–5.1)
PROT SERPL-MCNC: 6.7 G/DL (ref 6.3–8.2)
SODIUM SERPL-SCNC: 136 MMOL/L (ref 137–145)
TRIGL SERPL-MCNC: 191 MG/DL (ref 35–150)

## 2023-09-06 PROCEDURE — 80061 LIPID PANEL: CPT

## 2023-09-06 PROCEDURE — 36415 COLL VENOUS BLD VENIPUNCTURE: CPT

## 2023-09-06 PROCEDURE — 80053 COMPREHEN METABOLIC PANEL: CPT

## 2024-12-12 ENCOUNTER — HOSPITAL ENCOUNTER (OUTPATIENT)
Age: 89
End: 2024-12-12
Payer: MEDICARE

## 2024-12-12 DIAGNOSIS — I65.23: Primary | ICD-10-CM

## 2024-12-12 DIAGNOSIS — E78.5: ICD-10-CM

## 2024-12-12 DIAGNOSIS — I12.9: ICD-10-CM

## 2024-12-12 DIAGNOSIS — Z95.1: ICD-10-CM

## 2024-12-12 DIAGNOSIS — R94.39: ICD-10-CM

## 2024-12-12 DIAGNOSIS — Z82.3: ICD-10-CM

## 2024-12-12 DIAGNOSIS — D64.9: ICD-10-CM

## 2024-12-12 DIAGNOSIS — R79.89: ICD-10-CM

## 2024-12-12 DIAGNOSIS — N18.30: ICD-10-CM

## 2024-12-12 DIAGNOSIS — I35.1: ICD-10-CM

## 2024-12-12 LAB
ADD MANUAL DIFF / SLIDE REVIEW: NO
ALBUMIN SERPL-MCNC: 3.9 G/DL (ref 3.5–5)
ALBUMIN/GLOB SERPL: 1.4 {RATIO} (ref 1–2.8)
ALP SERPL-CCNC: 124 U/L (ref 38–126)
ALT SERPL-CCNC: 27 IU/L (ref ?–50)
BUN SERPL-MCNC: 26 MG/DL (ref 9–20)
CALCIUM SERPL-MCNC: 8.8 MG/DL (ref 8.4–10.2)
CHLORIDE SERPL-SCNC: 104 MMOL/L (ref 98–107)
CHOLEST SERPL-MCNC: 122 MG/DL (ref 140–199)
CO2 SERPL-SCNC: 25 MMOL/L (ref 22–32)
ESTIMATED GLOMERULAR FILT RATE: 38 ML/MIN (ref 60–?)
GLOBULIN SER CALC-MCNC: 2.7 G/DL (ref 1.7–4.1)
GLUCOSE SERPL-MCNC: 103 MG/DL (ref 80–110)
HDLC SERPL-MCNC: 29 MG/DL (ref 40–60)
HEMATOCRIT: 37 % (ref 41–53)
HEMOGLOBIN: 12.5 G/DL (ref 13.5–17.5)
HEMOLYSIS: < 15 (ref 0–50)
LYMPHOCYTES # SPEC AUTO: 2100 /UL (ref 1100–4500)
MCV RBC: 91.8 FL (ref 80–100)
MEAN CORPUSCULAR HEMOGLOBIN: 31 PG (ref 26–34)
MEAN CORPUSCULAR HGB CONC: 33.8 % (ref 30–36)
PLATELET COUNT: 247 X10^3/UL (ref 150–400)
POTASSIUM SERPL-SCNC: 4.9 MMOL/L (ref 3.4–5.1)
PROT SERPL-MCNC: 6.6 G/DL (ref 6.3–8.2)
SODIUM SERPL-SCNC: 137 MMOL/L (ref 137–145)
TRIGL SERPL-MCNC: 160 MG/DL (ref 35–150)
VIT B12 SERPL-MCNC: 781 PG/ML (ref 239–931)

## 2024-12-12 PROCEDURE — A9502 TC99M TETROFOSMIN: HCPCS

## 2024-12-12 PROCEDURE — 82607 VITAMIN B-12: CPT

## 2024-12-12 PROCEDURE — 36415 COLL VENOUS BLD VENIPUNCTURE: CPT

## 2024-12-12 PROCEDURE — 93880 EXTRACRANIAL BILAT STUDY: CPT

## 2024-12-12 PROCEDURE — 80061 LIPID PANEL: CPT

## 2024-12-12 PROCEDURE — 80053 COMPREHEN METABOLIC PANEL: CPT

## 2024-12-12 PROCEDURE — 93017 CV STRESS TEST TRACING ONLY: CPT

## 2024-12-12 PROCEDURE — 78452 HT MUSCLE IMAGE SPECT MULT: CPT

## 2024-12-12 PROCEDURE — 85025 COMPLETE CBC W/AUTO DIFF WBC: CPT

## 2024-12-12 NOTE — DI.US.S_ITS
PROCEDURE:  US CAROTID DOPPLER BI 
  
INDICATIONS:  Presence of aortocoronary bypass graft 
  
TECHNIQUE:   
Color and pulse Doppler interrogation was performed of both carotid systems, with image  
documentation and velocity measurements.   
  
COMPARISON:  None. 
  
FINDINGS:   
Stenosis calculations are based on SRU (Society of Radiologists in Ultrasound) criteria.   
  
  
Right side:   
Brachial blood pressure:  149/63 mm Hg.   
Common carotid artery peak systolic velocity:  74 cm/sec.   
Internal carotid artery peak systolic velocity:  118 cm/sec.   
Internal carotid artery end diastolic velocity:  24 cm/sec.   
External carotid artery peak systolic velocity:  122 cm/sec.   
ICA/CCA peak systolic ratio:  1.6 .   
Gray scale imaging description:  Minimal calcified plaque noted carotid bulb 
Percent internal carotid artery stenosis:  Less than 50 percent .   
Vertebral artery:  Flow direction is antegrade.   
  
Left side:   
Brachial blood pressure:  135/59 mm Hg. 
Common carotid artery peak systolic velocity:  69 cm/sec.   
Internal carotid artery peak systolic velocity:  111 cm/sec.   
Internal carotid artery end diastolic velocity:  21 cm/sec.   
External carotid artery peak systolic velocity:  94 cm/sec.   
ICA/CCA peak systolic ratio:  1.6 .   
Gray scale imaging description:  Mild-to-moderate calcified plaque formation proximal  
left internal carotid artery. 
Percent internal carotid artery stenosis:  Less than 50 percent . 
Vertebral artery:  Flow direction is antegrade.   
  
  
IMPRESSION:  
  
1. In the right carotid artery, there is less than 50 percent stenosis based on peak  
systolic velocity criteria. 
2. In the left carotid artery, there is less than 50 percent stenosis based on peak  
systolic velocity criteria. 
3. Antegrade vertebral arteries. 
  
  
  
  
Dictated by: Otf Hernandez M.D. on 12/12/2024 at 12:02      
Approved by: Otf Hernandez M.D. on 12/12/2024 at 12:06

## 2024-12-12 NOTE — DI.NM.S_ITS
PROCEDURE:  NM NUUN PERF SPECT R&S PHARM 
Rest and pharmacological stress myocardial perfusion SPECT with gated imaging and  
ejection fraction 
  
RADIOPHARMACEUTICAL: 12.6 mCi Tc-99m tetrafosmin IV at rest and 24.5 mCi Tc-99m  
tetrafosmin IV at peak effect of pharmacological stress.  1-day-protocol was performed.   
  
INDICATIONS:  Presence of aortocoronary bypass graft 
  
PQRS ATTESTATIONS:   
Measure 322 - Is this imaging test primarily performed on a low-risk surgery patient for  
preoperative evaluation within 30 days preceding their low-risk non-cardiac surgery?   
Low-risk surgery is defined as cardiac death or myocardial infarction less than 1%,  
including (but not limited to) endoscopic procedures, superficial procedures, cataract  
surgery, and excisional breast surgery:  Answer: No 
Measure 323 - Is this imaging test performed primarily for the monitoring of an  
asymptomatic patient who had percutaneous coronary intervention on the visit date or  
within 2 years of the visit date?  Answer: No 
Measure 324 - Is this imaging test performed primarily for the initial detection and risk  
assessment on an asymptomatic, low coronary heart disease patient?  Low CHD risk  
definition = clinicians should consider the maximum number of available patient factors  
used to estimate risk based on [place] (ATP III criteria), typically age, gender,  
diabetes, smoking status, and use of blood pressure medication, and integrate age  
appropriate estimates for missing elements, such as LDL or standard blood pressure.   
Answer: No 
  
TECHNIQUE:  Radiopharmaceutical was injected at peak stress test, and also at rest.   
SPECT images were obtained.  SPECT myocardial perfusion images were displayed in short  
axis, horizontal long axis, and vertical long axis views.  Gated images were reviewed  
using [employer] software.   
  
COMPARISON:  None. 
  
CARDIAC STRESS:   
A pharmacologic stress test was performed under the supervision of an attending staff,  
using an infusion of 0.4 mg.   
Hemodynamic data:  There is normal blood pressure and heart rate response to  
pharmacologic stress.   
Symptoms:  The patient denied anginal chest pain.   
Aminophylline: No 
EKG:  No diagnostic changes of ischemia; frequent PVCs noted in a bigeminal fashion  
intermittently present throughout the entire stress test. 
  
FINDINGS:   
  
Raw data:  There is good myocardial uptake of radiotracer.  No significant motion  
artifacts.  Lung-to-heart ratio is 0.35 (normal is less than 0.38 for tetrafosmin  
tracer).   
  
Left ventricle function:  Gated images demonstrate normal left ventricular wall  
thickening.  No segmental wall motion abnormalities.  No transient ischemic dilation; TID  
is 1.08 (normal less than 1.3).  Left ventricle resting end diastolic volume is 86 mL.   
Left ventricle stress ejection fraction is 71; normal range is above 45%.   
  
Myocardial perfusion: Rest images had moderate hypoperfusion in the inferior and apical  
segments.  Stress images demonstrated the same perfusion defect with increased  
hypoperfusion.  Prone images had improved perfusion in the basal to mid inferior segment  
as well as the apex.  There was a small area of mild hypoperfusion present in the distal  
inferior segment.  No other perfusion defects were noted in the rest, stress, and prone  
images.  This most likely represents a small, nontransmural myocardial infarction with no  
significant joel-infarct ischemia. 
  
IMPRESSION: Abnormal Lexiscan myocardial perfusion scan due to the presence of a small,  
nontransmural distal inferior infarct with no significant joel-infarct ischemia. 
  
  
Dictated by: Julius Diaz M.D. on 12/12/2024 at 17:23      
Approved by: Julius Diaz M.D. on 12/12/2024 at 17:26

## 2024-12-27 ENCOUNTER — HOSPITAL ENCOUNTER (OUTPATIENT)
Age: 89
End: 2024-12-27
Payer: OTHER GOVERNMENT

## 2024-12-27 DIAGNOSIS — I08.3: Primary | ICD-10-CM

## 2024-12-27 PROCEDURE — 93306 TTE W/DOPPLER COMPLETE: CPT

## 2024-12-27 NOTE — DI.ECHO.S_ITS
[facility] 
+---------+                        [facility] 
:         :                      [id number] 
:         :                      [place] 
:         :                          [id number] 
:         :                       Phone: [phone] 
[phone]                        [phone] 
                             Echocardiogram Report 
+-----------------------------------------------------------------------------+ 
:Name: VENKATA KHALIL        Study Date: 2024        Height: 66 in   : 
:[facility] MRN #: [id number]     ReadingLocation:              Weight: 180 lb  : 
:Account #: [id number]          Gender: Male                  BSA: 1.9 m2     : 
:: 1935                Age: 89 yrs                   BP: 157/101 mmHg: 
:Reason For Study: AORTIC VALVE INSUFFICIENCY                                 : 
:Ordering Physician: ANABEL,                                                 : 
:GUY                          Performed By: Isabel Tracey                    : 
:Referring: GUY LITTLE                                                    : 
+-----------------------------------------------------------------------------+ 
Interpretation Summary 
The patient was in normal sinus rhythm during the exam. 
The patient had frequent PVCs during the exam. 
  
The left ventricle is normal in size. 
The left ventricular ejection fraction is normal. 
The ejection fraction is estimated to be 60-65%. No significant change in LVEF 
from the previous study. 
LV dyssynchrony during PVCs. 
  
The right ventricle is normal size. 
Right ventricular systolic function is mildly reduced. 
There has been no significant change since the previous study. 
  
Aortic valve moderately calcified. 
There is mild to moderately reduced leaflet mobility. 
The peak aortic velocity is 2.89 m/sec. 
The aortic valve mean gradient is 22 mmHg. 
The calculated aortic valve area is 1.2 cm2. 
The peak aortic velocity on the previous exam was 1.94 m/sec. 
There is mild to moderate aortic stenosis. 
Compared to the prior echo study, there has been an increase in the severity 
of aortic stenosis. 
There is moderate aortic regurgitation. Previously mild to moderate. 
  
There is mild to moderate tricuspid regurgitation. 
Compared to the prior echo exam, there has been no change in TR severity. 
The right ventricular systolic pressure is estimated to be at least 37 mmHg 
based on an estimated right atrial pressure of 3 mm Hg. 
  
Procedure:   A two-dimensional transthoracic echocardiogram with color flow 
and Doppler was performed. The study quality was technically adequate. 
Comparison is made with the echocardiogram of 2021. The patient had 
frequent PVCs during the exam. Segments of bigemy throughout exam. The heart 
rate ranged between 59-68 bpm during the study. The patient was in normal 
sinus rhythm during the exam. 
Left Ventricle:   The left ventricle is normal in size. Proximal septal 
thickening is noted. There is no thrombus. The ejection fraction is estimated 
to be 60-65%. The left ventricular ejection fraction is normal. LV 
dyssynchrony during PVCs. Septal motion is consistent with conduction 
abnormality. Diastolic parameters suggest a relaxation abnormality of the left 
ventricle, consistent with probable normal filling pressures. 
Right Ventricle:   The right ventricle is normal size. Right ventricular 
systolic function is mildly reduced. There has been no significant change 
since the previous study. 
Atria:   The left atrial size is normal. Right atrial size is normal. There is 
no Doppler evidence for an interatrial shunt. 
Mitral Valve:   The mitral valve leaflets are slightly calcified. There is 
mild mitral annular calcification. There is mild mitral regurgitation. 
Aortic Valve:   The aortic valve is moderately calcified. The aortic valve is 
not well visualized. There is mild to moderately reduced leaflet mobility. The 
peak aortic velocity is 2.89 m/sec. The aortic valve mean gradient is 22 mmHg. 
The calculated aortic valve area is 1.2 cm2. The peak aortic velocity on the 
previous exam was 1.94 m/sec. There is mild to moderate aortic stenosis. 
Compared to the prior echo study, there has been an increase in the severity 
of aortic stenosis. There is moderate aortic regurgitation. 
Tricuspid Valve:   The tricuspid valve is normal. There is mild to moderate 
tricuspid regurgitation. The right ventricular systolic pressure is estimated 
to be at least 37 mmHg based on an estimated right atrial pressure of 3 mm Hg. 
Compared to the prior echo exam, there has been no change in TR severity. 
Pulmonic Valve:   The pulmonic valve is not well visualized. There is no 
pulmonic valvular regurgitation. 
Great Vessels:   The aortic root is normal size. The dimensions of the 
ascending aorta are normal. The IVC is of normal diameter and collapses 
greater than 50% with a sniff. This suggests a low right atrial pressure of 3 
mm Hg. 
Pericardium/ Pleura   There is no pericardial effusion. There is an anterior 
echo-free space consistent with a fat pad. There is no pleural effusion. 
  
MMode/2D Measurements & Calculations 
LVIDd: 5.4 cm                            LVOT diam: 2.1 cm 
LVIDs: 3.8 cm                            Ao root diam: 2.7 cm 
FS: 29.5 %                               Ao Arch Diam (Prox Trans): 3.0 cm 
IVSd: 0.95 cm 
LVPWd: 1.1 cm 
LV anderson. diameter/BSA (cm/m^2): 2.8 
LV sys. diameter/BSA (cm/m^2): 2.0 
        
______________________________________________________________________________ 
LA A2 area: 20.9 cm2                     RA long axis: 5.2 cm 
LA A4 area: 16.6 cm2                     RA area: 16.0 cm2 
LA length (vol): 5.3 cm                  RA vol: 41.5 ml 
LA vol: 55.2 ml                          RA VI: 21.7 ml/m2 
LA vol index: 28.9 ml/m2                 IVC diam: 1.4 cm 
  
        
______________________________________________________________________________ 
RVD1 (basal): 3.6 cm 
RVD2 (mid): 2.8 cm 
TAPSE: 1.5 cm 
  
Doppler Measurements & Calculations 
Ao V2 max: 288.1 cm/sec          LVOT Max Robbie: 86.8 cm/sec 
Ao V2 mean: 213.8 cm/sec         LV V1 max PG: 3.0 mmHg 
Ao max P.3 mmHg             LV V1 VTI: 21.0 cm 
Ao mean P.7 mmHg            VIDAL(I,D): 1.0 cm2 
Ao V2 VTI: 72.9 cm               VIDAL(V,D): 1.1 cm2 
                                 AS sev ratio: 0.29 
                                 VIDAL indexed to BSA (cm^2/m^2): 0.53 
                                 AI P1/2t: 565.6 msec 
                                 AI dec slope: 239.8 cm/sec2 
        
______________________________________________________________________________ 
MV E max robbie: 71.4 cm/sec        TR max robbie: 289.1 cm/sec 
MV A max robbie: 91.3 cm/sec        TR max P.4 mmHg 
MV E/A: 0.78                     PA pr(Accel): 41.3 mmHg 
Med Peak E' Robbie: 6.1 cm/sec 
E/E' med: 11.7 
Lat Peak E' Robbie: 8.8 cm/sec 
E/E' lat: 8.1 
E/e' average: 9.9 
MV dec time: 0.31 sec 
  
        
______________________________________________________________________________ 
SV(LVOT): 73.3 ml 
  
______________________________________________________________________________ 
                  Electronically signed by: Guy Little on 2025 
Reading Physician:03:34 PM

## 2025-06-24 ENCOUNTER — HOSPITAL ENCOUNTER (OUTPATIENT)
Age: 89
End: 2025-06-24
Payer: OTHER GOVERNMENT

## 2025-06-24 DIAGNOSIS — N18.30: Primary | ICD-10-CM

## 2025-06-24 DIAGNOSIS — E78.2: ICD-10-CM

## 2025-06-24 LAB
BUN SERPL-MCNC: 30 MG/DL (ref 9–20)
BUN/CREAT SERPL: 17.2 (ref 6–22)
CALCIUM SERPL-MCNC: 8.5 MG/DL (ref 8.4–10.2)
CHLORIDE SERPL-SCNC: 110 MMOL/L (ref 98–107)
CO2 SERPL-SCNC: 19 MMOL/L (ref 22–32)
CREAT SERPL-MCNC: 1.74 MG/DL (ref 0.66–1.25)
ESTIMATED GLOMERULAR FILT RATE: 37 ML/MIN (ref 60–?)
GLUCOSE SERPL-MCNC: 90 MG/DL (ref 70–99)
HEMOLYSIS: < 15 (ref 0–50)
POTASSIUM SERPL-SCNC: 4.7 MMOL/L (ref 3.4–5.1)
SODIUM SERPL-SCNC: 139 MMOL/L (ref 137–145)

## 2025-06-24 PROCEDURE — 80048 BASIC METABOLIC PNL TOTAL CA: CPT

## 2025-06-24 PROCEDURE — 36415 COLL VENOUS BLD VENIPUNCTURE: CPT
